# Patient Record
Sex: FEMALE | Race: WHITE | NOT HISPANIC OR LATINO | Employment: OTHER | URBAN - METROPOLITAN AREA
[De-identification: names, ages, dates, MRNs, and addresses within clinical notes are randomized per-mention and may not be internally consistent; named-entity substitution may affect disease eponyms.]

---

## 2024-01-03 ENCOUNTER — MEDICAL CORRESPONDENCE (OUTPATIENT)
Dept: HEALTH INFORMATION MANAGEMENT | Facility: CLINIC | Age: 64
End: 2024-01-03

## 2024-03-26 ENCOUNTER — TRANSFERRED RECORDS (OUTPATIENT)
Dept: HEALTH INFORMATION MANAGEMENT | Facility: CLINIC | Age: 64
End: 2024-03-26

## 2024-04-23 ENCOUNTER — ANESTHESIA EVENT (OUTPATIENT)
Dept: SURGERY | Facility: CLINIC | Age: 64
End: 2024-04-23
Payer: COMMERCIAL

## 2024-04-23 RX ORDER — MULTIVITAMIN WITH IRON
500 TABLET ORAL DAILY
COMMUNITY

## 2024-04-23 RX ORDER — AMLODIPINE BESYLATE 5 MG/1
5 TABLET ORAL DAILY
COMMUNITY

## 2024-04-23 RX ORDER — VIT C/B6/B5/MAGNESIUM/HERB 173 50-5-6-5MG
500 CAPSULE ORAL DAILY
COMMUNITY

## 2024-04-23 RX ORDER — ZINC GLUCONATE 50 MG
50 TABLET ORAL DAILY
COMMUNITY

## 2024-04-23 RX ORDER — GINKGO BILOBA LEAF EXTRACT 60 MG
1 CAPSULE ORAL DAILY
COMMUNITY

## 2024-04-23 ASSESSMENT — LIFESTYLE VARIABLES: TOBACCO_USE: 0

## 2024-04-23 ASSESSMENT — COPD QUESTIONNAIRES: COPD: 0

## 2024-04-24 ENCOUNTER — ANESTHESIA (OUTPATIENT)
Dept: SURGERY | Facility: CLINIC | Age: 64
End: 2024-04-24
Payer: COMMERCIAL

## 2024-04-24 ENCOUNTER — HOSPITAL ENCOUNTER (INPATIENT)
Facility: CLINIC | Age: 64
LOS: 1 days | Discharge: HOME | End: 2024-04-25
Attending: COLON & RECTAL SURGERY | Admitting: COLON & RECTAL SURGERY
Payer: COMMERCIAL

## 2024-04-24 ENCOUNTER — MEDICAL CORRESPONDENCE (OUTPATIENT)
Dept: HEALTH INFORMATION MANAGEMENT | Facility: CLINIC | Age: 64
End: 2024-04-24
Payer: COMMERCIAL

## 2024-04-24 DIAGNOSIS — N81.2 INCOMPLETE UTEROVAGINAL PROLAPSE: Primary | ICD-10-CM

## 2024-04-24 PROBLEM — N81.9 PROLAPSE OF FEMALE PELVIC ORGANS: Status: ACTIVE | Noted: 2024-04-24

## 2024-04-24 LAB
ABO/RH(D): NORMAL
ALBUMIN SERPL BCG-MCNC: 4.7 G/DL (ref 3.5–5.2)
ANION GAP SERPL CALCULATED.3IONS-SCNC: 16 MMOL/L (ref 7–15)
ANTIBODY SCREEN: NEGATIVE
BUN SERPL-MCNC: 22.6 MG/DL (ref 8–23)
CALCIUM SERPL-MCNC: 9.5 MG/DL (ref 8.8–10.2)
CHLORIDE SERPL-SCNC: 104 MMOL/L (ref 98–107)
CREAT SERPL-MCNC: 1.18 MG/DL (ref 0.51–0.95)
DEPRECATED HCO3 PLAS-SCNC: 22 MMOL/L (ref 22–29)
EGFRCR SERPLBLD CKD-EPI 2021: 51 ML/MIN/1.73M2
ERYTHROCYTE [DISTWIDTH] IN BLOOD BY AUTOMATED COUNT: 13.1 % (ref 10–15)
GLUCOSE SERPL-MCNC: 79 MG/DL (ref 70–99)
HCT VFR BLD AUTO: 41.8 % (ref 35–47)
HGB BLD-MCNC: 13.9 G/DL (ref 11.7–15.7)
MCH RBC QN AUTO: 30.6 PG (ref 26.5–33)
MCHC RBC AUTO-ENTMCNC: 33.3 G/DL (ref 31.5–36.5)
MCV RBC AUTO: 92 FL (ref 78–100)
PLATELET # BLD AUTO: 263 10E3/UL (ref 150–450)
POTASSIUM SERPL-SCNC: 3.9 MMOL/L (ref 3.4–5.3)
RBC # BLD AUTO: 4.54 10E6/UL (ref 3.8–5.2)
SODIUM SERPL-SCNC: 142 MMOL/L (ref 135–145)
SPECIMEN EXPIRATION DATE: NORMAL
WBC # BLD AUTO: 8.2 10E3/UL (ref 4–11)

## 2024-04-24 PROCEDURE — 0UT74ZZ RESECTION OF BILATERAL FALLOPIAN TUBES, PERCUTANEOUS ENDOSCOPIC APPROACH: ICD-10-PCS | Performed by: OBSTETRICS & GYNECOLOGY

## 2024-04-24 PROCEDURE — 0USG4ZZ REPOSITION VAGINA, PERCUTANEOUS ENDOSCOPIC APPROACH: ICD-10-PCS | Performed by: OBSTETRICS & GYNECOLOGY

## 2024-04-24 PROCEDURE — 0UT24ZZ RESECTION OF BILATERAL OVARIES, PERCUTANEOUS ENDOSCOPIC APPROACH: ICD-10-PCS | Performed by: OBSTETRICS & GYNECOLOGY

## 2024-04-24 PROCEDURE — 88307 TISSUE EXAM BY PATHOLOGIST: CPT | Mod: 26 | Performed by: PATHOLOGY

## 2024-04-24 PROCEDURE — 0UT94ZL RESECTION OF UTERUS, SUPRACERVICAL, PERCUTANEOUS ENDOSCOPIC APPROACH: ICD-10-PCS | Performed by: OBSTETRICS & GYNECOLOGY

## 2024-04-24 PROCEDURE — 258N000003 HC RX IP 258 OP 636: Performed by: COLON & RECTAL SURGERY

## 2024-04-24 PROCEDURE — 250N000011 HC RX IP 250 OP 636: Performed by: COLON & RECTAL SURGERY

## 2024-04-24 PROCEDURE — 45400 LAPAROSCOPIC PROC: CPT | Performed by: ANESTHESIOLOGY

## 2024-04-24 PROCEDURE — 370N000017 HC ANESTHESIA TECHNICAL FEE, PER MIN: Performed by: COLON & RECTAL SURGERY

## 2024-04-24 PROCEDURE — 82040 ASSAY OF SERUM ALBUMIN: CPT | Performed by: COLON & RECTAL SURGERY

## 2024-04-24 PROCEDURE — C1771 REP DEV, URINARY, W/SLING: HCPCS | Performed by: COLON & RECTAL SURGERY

## 2024-04-24 PROCEDURE — 88307 TISSUE EXAM BY PATHOLOGIST: CPT | Mod: TC | Performed by: COLON & RECTAL SURGERY

## 2024-04-24 PROCEDURE — 85027 COMPLETE CBC AUTOMATED: CPT | Performed by: COLON & RECTAL SURGERY

## 2024-04-24 PROCEDURE — 710N000009 HC RECOVERY PHASE 1, LEVEL 1, PER MIN: Performed by: COLON & RECTAL SURGERY

## 2024-04-24 PROCEDURE — 0TSD4ZZ REPOSITION URETHRA, PERCUTANEOUS ENDOSCOPIC APPROACH: ICD-10-PCS | Performed by: OBSTETRICS & GYNECOLOGY

## 2024-04-24 PROCEDURE — 0DSP4ZZ REPOSITION RECTUM, PERCUTANEOUS ENDOSCOPIC APPROACH: ICD-10-PCS | Performed by: COLON & RECTAL SURGERY

## 2024-04-24 PROCEDURE — 250N000013 HC RX MED GY IP 250 OP 250 PS 637: Performed by: OBSTETRICS & GYNECOLOGY

## 2024-04-24 PROCEDURE — 250N000009 HC RX 250: Performed by: NURSE ANESTHETIST, CERTIFIED REGISTERED

## 2024-04-24 PROCEDURE — 0TJB8ZZ INSPECTION OF BLADDER, VIA NATURAL OR ARTIFICIAL OPENING ENDOSCOPIC: ICD-10-PCS | Performed by: OBSTETRICS & GYNECOLOGY

## 2024-04-24 PROCEDURE — 120N000001 HC R&B MED SURG/OB

## 2024-04-24 PROCEDURE — 360N000080 HC SURGERY LEVEL 7, PER MIN: Performed by: COLON & RECTAL SURGERY

## 2024-04-24 PROCEDURE — 258N000003 HC RX IP 258 OP 636: Performed by: ANESTHESIOLOGY

## 2024-04-24 PROCEDURE — 0UQG4ZZ REPAIR VAGINA, PERCUTANEOUS ENDOSCOPIC APPROACH: ICD-10-PCS | Performed by: OBSTETRICS & GYNECOLOGY

## 2024-04-24 PROCEDURE — 250N000025 HC SEVOFLURANE, PER MIN: Performed by: COLON & RECTAL SURGERY

## 2024-04-24 PROCEDURE — 0DUP4JZ SUPPLEMENT RECTUM WITH SYNTHETIC SUBSTITUTE, PERCUTANEOUS ENDOSCOPIC APPROACH: ICD-10-PCS | Performed by: COLON & RECTAL SURGERY

## 2024-04-24 PROCEDURE — 45400 LAPAROSCOPIC PROC: CPT | Performed by: NURSE ANESTHETIST, CERTIFIED REGISTERED

## 2024-04-24 PROCEDURE — 258N000001 HC RX 258: Performed by: COLON & RECTAL SURGERY

## 2024-04-24 PROCEDURE — 250N000011 HC RX IP 250 OP 636: Performed by: NURSE ANESTHETIST, CERTIFIED REGISTERED

## 2024-04-24 PROCEDURE — 250N000009 HC RX 250: Performed by: COLON & RECTAL SURGERY

## 2024-04-24 PROCEDURE — 272N000001 HC OR GENERAL SUPPLY STERILE: Performed by: COLON & RECTAL SURGERY

## 2024-04-24 PROCEDURE — 0JQC0ZZ REPAIR PELVIC REGION SUBCUTANEOUS TISSUE AND FASCIA, OPEN APPROACH: ICD-10-PCS | Performed by: OBSTETRICS & GYNECOLOGY

## 2024-04-24 PROCEDURE — 250N000011 HC RX IP 250 OP 636: Performed by: ANESTHESIOLOGY

## 2024-04-24 PROCEDURE — 8E0W4CZ ROBOTIC ASSISTED PROCEDURE OF TRUNK REGION, PERCUTANEOUS ENDOSCOPIC APPROACH: ICD-10-PCS | Performed by: OBSTETRICS & GYNECOLOGY

## 2024-04-24 PROCEDURE — C1763 CONN TISS, NON-HUMAN: HCPCS | Performed by: COLON & RECTAL SURGERY

## 2024-04-24 PROCEDURE — 86900 BLOOD TYPING SEROLOGIC ABO: CPT | Performed by: ANESTHESIOLOGY

## 2024-04-24 PROCEDURE — 80048 BASIC METABOLIC PNL TOTAL CA: CPT | Performed by: COLON & RECTAL SURGERY

## 2024-04-24 PROCEDURE — 258N000003 HC RX IP 258 OP 636: Performed by: NURSE ANESTHETIST, CERTIFIED REGISTERED

## 2024-04-24 PROCEDURE — 250N000013 HC RX MED GY IP 250 OP 250 PS 637: Performed by: COLON & RECTAL SURGERY

## 2024-04-24 PROCEDURE — 36415 COLL VENOUS BLD VENIPUNCTURE: CPT | Performed by: COLON & RECTAL SURGERY

## 2024-04-24 PROCEDURE — 999N000141 HC STATISTIC PRE-PROCEDURE NURSING ASSESSMENT: Performed by: COLON & RECTAL SURGERY

## 2024-04-24 DEVICE — SYNTHETIC MESH
Type: IMPLANTABLE DEVICE | Site: PERITONEUM | Status: FUNCTIONAL
Brand: POLYFORM™

## 2024-04-24 DEVICE — TRANSVAGINAL MID-URETHRAL SLING
Type: IMPLANTABLE DEVICE | Site: PELVIS | Status: FUNCTIONAL
Brand: ADVANTAGE FIT™  SYSTEM

## 2024-04-24 RX ORDER — LEVOTHYROXINE SODIUM 75 UG/1
75 TABLET ORAL EVERY EVENING
Status: DISCONTINUED | OUTPATIENT
Start: 2024-04-24 | End: 2024-04-25 | Stop reason: HOSPADM

## 2024-04-24 RX ORDER — SIMETHICONE 80 MG
80 TABLET,CHEWABLE ORAL 4 TIMES DAILY PRN
Status: DISCONTINUED | OUTPATIENT
Start: 2024-04-24 | End: 2024-04-25 | Stop reason: HOSPADM

## 2024-04-24 RX ORDER — DEXAMETHASONE SODIUM PHOSPHATE 4 MG/ML
INJECTION, SOLUTION INTRA-ARTICULAR; INTRALESIONAL; INTRAMUSCULAR; INTRAVENOUS; SOFT TISSUE PRN
Status: DISCONTINUED | OUTPATIENT
Start: 2024-04-24 | End: 2024-04-24

## 2024-04-24 RX ORDER — HYDROMORPHONE HCL IN WATER/PF 6 MG/30 ML
0.2 PATIENT CONTROLLED ANALGESIA SYRINGE INTRAVENOUS
Status: DISCONTINUED | OUTPATIENT
Start: 2024-04-24 | End: 2024-04-25 | Stop reason: HOSPADM

## 2024-04-24 RX ORDER — KETOROLAC TROMETHAMINE 15 MG/ML
15 INJECTION, SOLUTION INTRAMUSCULAR; INTRAVENOUS EVERY 6 HOURS PRN
Status: DISCONTINUED | OUTPATIENT
Start: 2024-04-24 | End: 2024-04-25 | Stop reason: HOSPADM

## 2024-04-24 RX ORDER — ONDANSETRON 2 MG/ML
INJECTION INTRAMUSCULAR; INTRAVENOUS PRN
Status: DISCONTINUED | OUTPATIENT
Start: 2024-04-24 | End: 2024-04-24

## 2024-04-24 RX ORDER — ONDANSETRON 4 MG/1
4 TABLET, ORALLY DISINTEGRATING ORAL EVERY 30 MIN PRN
Status: DISCONTINUED | OUTPATIENT
Start: 2024-04-24 | End: 2024-04-24 | Stop reason: HOSPADM

## 2024-04-24 RX ORDER — ACETAMINOPHEN 325 MG/1
650 TABLET ORAL EVERY 4 HOURS PRN
Status: DISCONTINUED | OUTPATIENT
Start: 2024-04-27 | End: 2024-04-25 | Stop reason: HOSPADM

## 2024-04-24 RX ORDER — FENTANYL CITRATE 0.05 MG/ML
50 INJECTION, SOLUTION INTRAMUSCULAR; INTRAVENOUS
Status: DISCONTINUED | OUTPATIENT
Start: 2024-04-24 | End: 2024-04-24 | Stop reason: HOSPADM

## 2024-04-24 RX ORDER — SODIUM CHLORIDE, SODIUM LACTATE, POTASSIUM CHLORIDE, CALCIUM CHLORIDE 600; 310; 30; 20 MG/100ML; MG/100ML; MG/100ML; MG/100ML
INJECTION, SOLUTION INTRAVENOUS CONTINUOUS
Status: DISCONTINUED | OUTPATIENT
Start: 2024-04-24 | End: 2024-04-24 | Stop reason: HOSPADM

## 2024-04-24 RX ORDER — FENTANYL CITRATE 50 UG/ML
INJECTION, SOLUTION INTRAMUSCULAR; INTRAVENOUS PRN
Status: DISCONTINUED | OUTPATIENT
Start: 2024-04-24 | End: 2024-04-24

## 2024-04-24 RX ORDER — NALOXONE HYDROCHLORIDE 0.4 MG/ML
0.2 INJECTION, SOLUTION INTRAMUSCULAR; INTRAVENOUS; SUBCUTANEOUS
Status: DISCONTINUED | OUTPATIENT
Start: 2024-04-24 | End: 2024-04-25 | Stop reason: HOSPADM

## 2024-04-24 RX ORDER — ONDANSETRON 4 MG/1
4 TABLET, ORALLY DISINTEGRATING ORAL EVERY 6 HOURS PRN
Status: DISCONTINUED | OUTPATIENT
Start: 2024-04-24 | End: 2024-04-25 | Stop reason: HOSPADM

## 2024-04-24 RX ORDER — CEFAZOLIN SODIUM/WATER 2 G/20 ML
2 SYRINGE (ML) INTRAVENOUS SEE ADMIN INSTRUCTIONS
Status: DISCONTINUED | OUTPATIENT
Start: 2024-04-24 | End: 2024-04-24 | Stop reason: HOSPADM

## 2024-04-24 RX ORDER — GEMFIBROZIL 600 MG/1
300 TABLET, FILM COATED ORAL ONCE
COMMUNITY

## 2024-04-24 RX ORDER — ONDANSETRON 2 MG/ML
4 INJECTION INTRAMUSCULAR; INTRAVENOUS EVERY 30 MIN PRN
Status: DISCONTINUED | OUTPATIENT
Start: 2024-04-24 | End: 2024-04-24 | Stop reason: HOSPADM

## 2024-04-24 RX ORDER — HYDROMORPHONE HCL IN WATER/PF 6 MG/30 ML
0.2 PATIENT CONTROLLED ANALGESIA SYRINGE INTRAVENOUS EVERY 5 MIN PRN
Status: DISCONTINUED | OUTPATIENT
Start: 2024-04-24 | End: 2024-04-24 | Stop reason: HOSPADM

## 2024-04-24 RX ORDER — PHENAZOPYRIDINE HYDROCHLORIDE 200 MG/1
200 TABLET, FILM COATED ORAL ONCE
Status: COMPLETED | OUTPATIENT
Start: 2024-04-24 | End: 2024-04-24

## 2024-04-24 RX ORDER — LIDOCAINE 40 MG/G
CREAM TOPICAL
Status: DISCONTINUED | OUTPATIENT
Start: 2024-04-24 | End: 2024-04-25 | Stop reason: HOSPADM

## 2024-04-24 RX ORDER — FENTANYL CITRATE 0.05 MG/ML
25 INJECTION, SOLUTION INTRAMUSCULAR; INTRAVENOUS EVERY 5 MIN PRN
Status: DISCONTINUED | OUTPATIENT
Start: 2024-04-24 | End: 2024-04-24 | Stop reason: HOSPADM

## 2024-04-24 RX ORDER — NALOXONE HYDROCHLORIDE 0.4 MG/ML
0.1 INJECTION, SOLUTION INTRAMUSCULAR; INTRAVENOUS; SUBCUTANEOUS
Status: DISCONTINUED | OUTPATIENT
Start: 2024-04-24 | End: 2024-04-24 | Stop reason: HOSPADM

## 2024-04-24 RX ORDER — HYDROMORPHONE HCL IN WATER/PF 6 MG/30 ML
0.4 PATIENT CONTROLLED ANALGESIA SYRINGE INTRAVENOUS
Status: DISCONTINUED | OUTPATIENT
Start: 2024-04-24 | End: 2024-04-25 | Stop reason: HOSPADM

## 2024-04-24 RX ORDER — FENTANYL CITRATE 0.05 MG/ML
50 INJECTION, SOLUTION INTRAMUSCULAR; INTRAVENOUS EVERY 5 MIN PRN
Status: DISCONTINUED | OUTPATIENT
Start: 2024-04-24 | End: 2024-04-24 | Stop reason: HOSPADM

## 2024-04-24 RX ORDER — ONDANSETRON 2 MG/ML
4 INJECTION INTRAMUSCULAR; INTRAVENOUS ONCE
Status: COMPLETED | OUTPATIENT
Start: 2024-04-24 | End: 2024-04-24

## 2024-04-24 RX ORDER — METRONIDAZOLE 500 MG/100ML
500 INJECTION, SOLUTION INTRAVENOUS
Status: COMPLETED | OUTPATIENT
Start: 2024-04-24 | End: 2024-04-24

## 2024-04-24 RX ORDER — ACETAMINOPHEN 325 MG/1
975 TABLET ORAL EVERY 8 HOURS
Status: DISCONTINUED | OUTPATIENT
Start: 2024-04-24 | End: 2024-04-25 | Stop reason: HOSPADM

## 2024-04-24 RX ORDER — OXYCODONE HYDROCHLORIDE 5 MG/1
5 TABLET ORAL EVERY 4 HOURS PRN
Status: DISCONTINUED | OUTPATIENT
Start: 2024-04-24 | End: 2024-04-25 | Stop reason: HOSPADM

## 2024-04-24 RX ORDER — ONDANSETRON 2 MG/ML
4 INJECTION INTRAMUSCULAR; INTRAVENOUS EVERY 6 HOURS PRN
Status: DISCONTINUED | OUTPATIENT
Start: 2024-04-24 | End: 2024-04-25 | Stop reason: HOSPADM

## 2024-04-24 RX ORDER — SODIUM CHLORIDE, SODIUM LACTATE, POTASSIUM CHLORIDE, CALCIUM CHLORIDE 600; 310; 30; 20 MG/100ML; MG/100ML; MG/100ML; MG/100ML
INJECTION, SOLUTION INTRAVENOUS CONTINUOUS
Status: DISCONTINUED | OUTPATIENT
Start: 2024-04-24 | End: 2024-04-25 | Stop reason: HOSPADM

## 2024-04-24 RX ORDER — PROPOFOL 10 MG/ML
INJECTION, EMULSION INTRAVENOUS PRN
Status: DISCONTINUED | OUTPATIENT
Start: 2024-04-24 | End: 2024-04-24

## 2024-04-24 RX ORDER — ACETAMINOPHEN 325 MG/1
975 TABLET ORAL ONCE
Status: COMPLETED | OUTPATIENT
Start: 2024-04-24 | End: 2024-04-24

## 2024-04-24 RX ORDER — NALOXONE HYDROCHLORIDE 0.4 MG/ML
0.4 INJECTION, SOLUTION INTRAMUSCULAR; INTRAVENOUS; SUBCUTANEOUS
Status: DISCONTINUED | OUTPATIENT
Start: 2024-04-24 | End: 2024-04-25 | Stop reason: HOSPADM

## 2024-04-24 RX ORDER — BUPIVACAINE HYDROCHLORIDE 2.5 MG/ML
INJECTION, SOLUTION INFILTRATION; PERINEURAL PRN
Status: DISCONTINUED | OUTPATIENT
Start: 2024-04-24 | End: 2024-04-24 | Stop reason: HOSPADM

## 2024-04-24 RX ORDER — LIDOCAINE HYDROCHLORIDE 20 MG/ML
INJECTION, SOLUTION INFILTRATION; PERINEURAL PRN
Status: DISCONTINUED | OUTPATIENT
Start: 2024-04-24 | End: 2024-04-24

## 2024-04-24 RX ORDER — LORAZEPAM 2 MG/ML
0.5 INJECTION INTRAMUSCULAR EVERY 6 HOURS PRN
Status: DISCONTINUED | OUTPATIENT
Start: 2024-04-24 | End: 2024-04-25 | Stop reason: HOSPADM

## 2024-04-24 RX ORDER — CEFAZOLIN SODIUM/WATER 2 G/20 ML
2 SYRINGE (ML) INTRAVENOUS
Status: COMPLETED | OUTPATIENT
Start: 2024-04-24 | End: 2024-04-24

## 2024-04-24 RX ORDER — HYDROMORPHONE HCL IN WATER/PF 6 MG/30 ML
0.4 PATIENT CONTROLLED ANALGESIA SYRINGE INTRAVENOUS EVERY 5 MIN PRN
Status: DISCONTINUED | OUTPATIENT
Start: 2024-04-24 | End: 2024-04-24 | Stop reason: HOSPADM

## 2024-04-24 RX ORDER — MAGNESIUM HYDROXIDE 1200 MG/15ML
LIQUID ORAL PRN
Status: DISCONTINUED | OUTPATIENT
Start: 2024-04-24 | End: 2024-04-24 | Stop reason: HOSPADM

## 2024-04-24 RX ORDER — PROPOFOL 10 MG/ML
INJECTION, EMULSION INTRAVENOUS CONTINUOUS PRN
Status: DISCONTINUED | OUTPATIENT
Start: 2024-04-24 | End: 2024-04-24

## 2024-04-24 RX ORDER — OXYCODONE HYDROCHLORIDE 5 MG/1
10 TABLET ORAL EVERY 4 HOURS PRN
Status: DISCONTINUED | OUTPATIENT
Start: 2024-04-24 | End: 2024-04-25 | Stop reason: HOSPADM

## 2024-04-24 RX ORDER — LEVOTHYROXINE SODIUM 75 UG/1
75 TABLET ORAL DAILY
COMMUNITY

## 2024-04-24 RX ORDER — DIPHENHYDRAMINE HYDROCHLORIDE 50 MG/ML
25 INJECTION INTRAMUSCULAR; INTRAVENOUS EVERY 6 HOURS PRN
Status: DISCONTINUED | OUTPATIENT
Start: 2024-04-24 | End: 2024-04-25 | Stop reason: HOSPADM

## 2024-04-24 RX ADMIN — ROCURONIUM BROMIDE 10 MG: 50 INJECTION, SOLUTION INTRAVENOUS at 10:06

## 2024-04-24 RX ADMIN — METRONIDAZOLE 500 MG: 500 INJECTION, SOLUTION INTRAVENOUS at 06:40

## 2024-04-24 RX ADMIN — PHENYLEPHRINE HYDROCHLORIDE 100 MCG: 10 INJECTION INTRAVENOUS at 08:18

## 2024-04-24 RX ADMIN — ONDANSETRON 4 MG: 2 INJECTION INTRAMUSCULAR; INTRAVENOUS at 11:11

## 2024-04-24 RX ADMIN — ONDANSETRON 4 MG: 2 INJECTION INTRAMUSCULAR; INTRAVENOUS at 07:05

## 2024-04-24 RX ADMIN — ACETAMINOPHEN 975 MG: 325 TABLET, FILM COATED ORAL at 06:30

## 2024-04-24 RX ADMIN — SODIUM CHLORIDE, POTASSIUM CHLORIDE, SODIUM LACTATE AND CALCIUM CHLORIDE: 600; 310; 30; 20 INJECTION, SOLUTION INTRAVENOUS at 14:19

## 2024-04-24 RX ADMIN — PHENYLEPHRINE HYDROCHLORIDE 0.25 MCG/KG/MIN: 10 INJECTION INTRAVENOUS at 09:13

## 2024-04-24 RX ADMIN — HYDROMORPHONE HYDROCHLORIDE 0.5 MG: 1 INJECTION, SOLUTION INTRAMUSCULAR; INTRAVENOUS; SUBCUTANEOUS at 08:42

## 2024-04-24 RX ADMIN — SODIUM CHLORIDE, POTASSIUM CHLORIDE, SODIUM LACTATE AND CALCIUM CHLORIDE: 600; 310; 30; 20 INJECTION, SOLUTION INTRAVENOUS at 09:13

## 2024-04-24 RX ADMIN — FENTANYL CITRATE 100 MCG: 50 INJECTION INTRAMUSCULAR; INTRAVENOUS at 08:03

## 2024-04-24 RX ADMIN — DEXAMETHASONE SODIUM PHOSPHATE 4 MG: 4 INJECTION, SOLUTION INTRA-ARTICULAR; INTRALESIONAL; INTRAMUSCULAR; INTRAVENOUS; SOFT TISSUE at 08:15

## 2024-04-24 RX ADMIN — ACETAMINOPHEN 975 MG: 325 TABLET, FILM COATED ORAL at 21:12

## 2024-04-24 RX ADMIN — HYDROMORPHONE HYDROCHLORIDE 0.4 MG: 0.2 INJECTION, SOLUTION INTRAMUSCULAR; INTRAVENOUS; SUBCUTANEOUS at 16:23

## 2024-04-24 RX ADMIN — FENTANYL CITRATE 50 MCG: 50 INJECTION, SOLUTION INTRAMUSCULAR; INTRAVENOUS at 12:16

## 2024-04-24 RX ADMIN — PHENAZOPYRIDINE 200 MG: 200 TABLET ORAL at 06:30

## 2024-04-24 RX ADMIN — ROCURONIUM BROMIDE 10 MG: 50 INJECTION, SOLUTION INTRAVENOUS at 10:46

## 2024-04-24 RX ADMIN — ROCURONIUM BROMIDE 30 MG: 50 INJECTION, SOLUTION INTRAVENOUS at 08:58

## 2024-04-24 RX ADMIN — Medication 2 G: at 07:50

## 2024-04-24 RX ADMIN — KETOROLAC TROMETHAMINE 15 MG: 15 INJECTION, SOLUTION INTRAMUSCULAR; INTRAVENOUS at 16:23

## 2024-04-24 RX ADMIN — HYDROMORPHONE HYDROCHLORIDE 0.5 MG: 1 INJECTION, SOLUTION INTRAMUSCULAR; INTRAVENOUS; SUBCUTANEOUS at 10:54

## 2024-04-24 RX ADMIN — PHENYLEPHRINE HYDROCHLORIDE 100 MCG: 10 INJECTION INTRAVENOUS at 08:21

## 2024-04-24 RX ADMIN — HYDROMORPHONE HYDROCHLORIDE 0.2 MG: 0.2 INJECTION, SOLUTION INTRAMUSCULAR; INTRAVENOUS; SUBCUTANEOUS at 13:12

## 2024-04-24 RX ADMIN — LIDOCAINE HYDROCHLORIDE 100 MG: 20 INJECTION, SOLUTION INFILTRATION; PERINEURAL at 08:03

## 2024-04-24 RX ADMIN — PHENYLEPHRINE HYDROCHLORIDE 100 MCG: 10 INJECTION INTRAVENOUS at 09:13

## 2024-04-24 RX ADMIN — SUGAMMADEX 200 MG: 100 INJECTION, SOLUTION INTRAVENOUS at 11:33

## 2024-04-24 RX ADMIN — PHENYLEPHRINE HYDROCHLORIDE 100 MCG: 10 INJECTION INTRAVENOUS at 08:54

## 2024-04-24 RX ADMIN — PROPOFOL 170 MG: 10 INJECTION, EMULSION INTRAVENOUS at 08:03

## 2024-04-24 RX ADMIN — FENTANYL CITRATE 50 MCG: 50 INJECTION, SOLUTION INTRAMUSCULAR; INTRAVENOUS at 12:29

## 2024-04-24 RX ADMIN — MIDAZOLAM 2 MG: 1 INJECTION INTRAMUSCULAR; INTRAVENOUS at 07:50

## 2024-04-24 RX ADMIN — SODIUM CHLORIDE, POTASSIUM CHLORIDE, SODIUM LACTATE AND CALCIUM CHLORIDE: 600; 310; 30; 20 INJECTION, SOLUTION INTRAVENOUS at 06:39

## 2024-04-24 RX ADMIN — PROPOFOL 30 MCG/KG/MIN: 10 INJECTION, EMULSION INTRAVENOUS at 08:08

## 2024-04-24 RX ADMIN — ROCURONIUM BROMIDE 50 MG: 50 INJECTION, SOLUTION INTRAVENOUS at 08:03

## 2024-04-24 RX ADMIN — LEVOTHYROXINE SODIUM 75 MCG: 75 TABLET ORAL at 21:12

## 2024-04-24 ASSESSMENT — ACTIVITIES OF DAILY LIVING (ADL)
ADLS_ACUITY_SCORE: 27
ADLS_ACUITY_SCORE: 18
ADLS_ACUITY_SCORE: 27
ADLS_ACUITY_SCORE: 35
ADLS_ACUITY_SCORE: 22
ADLS_ACUITY_SCORE: 22
ADLS_ACUITY_SCORE: 18
ADLS_ACUITY_SCORE: 22
ADLS_ACUITY_SCORE: 27
ADLS_ACUITY_SCORE: 18
ADLS_ACUITY_SCORE: 27
ADLS_ACUITY_SCORE: 18
ADLS_ACUITY_SCORE: 33
ADLS_ACUITY_SCORE: 27
ADLS_ACUITY_SCORE: 22

## 2024-04-24 NOTE — DISCHARGE INSTRUCTIONS
[facility]  Prolapse/Pelvic Reconstructive Surgery  Instructions for Caring for yourself after Surgery     How do I manage my pain?   Pain and tenderness should lessen each day. To help keep pain under control, use the following guidelines:  Apply ice packs to your perineum (vaginal and rectal area) for the 1st couple of days.  Take 600 milligrams (mg) of ibuprofen (Advil) every 6 hours for the 1st several days.   Use your prescribed narcotic (hydromorphone) for additional pain relief as needed.  Do not drive, drink alcohol or make any major decisions, such as signing important papers or managing legal issues, while taking prescription pain medication.   Take pain medication with food to avoid an upset stomach.    How do I care for my perineum?  Use pads for vaginal discharge after surgery. Discharge is normal and can last several weeks. Discharge may appear bloody, yellow or white.  Do not place anything in your vagina until advised by your doctor.     What about bathing?     Do not take a tub bath, use a hot tub or swim until advised by your doctor. You may take showers.    What about bowel and bladder management?  Keep stools soft and regular. We recommend using the following medicine that loosens stools and increases bowel movements:  MiraLAX-  17 grams or a capful daily following surgery.    When urinating, do not bear down. Relax and allow the bladder muscle to contract. If you are unable to urinate, contact your doctor.  If you go home with a catheter, your doctor may prescribe an antibiotic for you to take before bed to help prevent infection. Follow up in the clinic as instructed to have the catheter removed.    What about activity?  Do not lift more than 10 pounds for 12 weeks after surgery. Avoid heavy pushing or pulling, such as vacuuming or lawn mowing. Your body s tissues need time to heal and regain maximum strength.  Keep Active. Walking is encouraged. Gradually build up how long and far  you walk. Climbing stairs is OK if able.      You may resume driving when you are no longer taking narcotic pain medication and have the strength to use the brake pedal as needed.     When do I call my doctor?  Call Dr. Livingston call [phone] if you have:     (for urgent questions/concerns CELL PHONE [phone])  -Any post-operative questions or concerns   -A fever over 100.4 F (38 C)    -Difficulty emptying your bladder  -Chills       -Worsening pain              -Nausea or vomiting

## 2024-04-24 NOTE — BRIEF OP NOTE
[facility]    Brief Operative Note    Pre-operative diagnosis: Special screening for malignant neoplasms, colon [Z12.11]  Post-operative diagnosis Same as pre-operative diagnosis    Procedure: ROBOTIC SUTURE RECTOPEXY, N/A - Abdomen  ROBOTIC LAPAROSCOPIC SACRAL COLPOPEXY, SUPRACERVICAL HYSTERECTOMY WITH BILATERAL SALPINGECTOMY, ABDOMINAL ENTEROCELE REPAIR, CYSTOSCOPY POSSIBLE MIDURETHRAL SLING, N/A - Abdomen  BILATERAL OOPHORECTOMY, Bilateral - Abdomen  POSTERIOR REPAIR, N/A - Perineum    Surgeon: Surgeons and Role:  Panel 1:     * Judith Atwood MD - Primary     * Jonn Julien PA-C - Assisting  Panel 2:     * Lynn Livingston MD - Primary  Panel 3:     * Lynn Livingston MD - Primary  Anesthesia: General   Estimated Blood Loss: 50 mL    Drains: None  Specimens:   ID Type Source Tests Collected by Time Destination   1 : Uterus, Bilateral Fallopian Tubes & Ovaries Tissue Uterus, Bilateral Fallopian Tubes & Ovaries SURGICAL PATHOLOGY EXAM Judith Atwood MD 4/24/2024 11:14 AM      Findings:   None.  Complications: None.  Implants:   Implant Name Type Inv. Item Serial No.  Lot No. LRB No. Used Action   MESH SLING PROLAPSE POLYFORM SYNTH [id number] - [id number] Mesh MESH SLING PROLAPSE POLYFORM SYNTH [id number]  [employer] [id number] N/A 1 Implanted   MESH SLING ADVANTAGE FIT SYSTEM [id number] - [id number] Mesh MESH SLING ADVANTAGE FIT SYSTEM [id number]  [employer] [id number] N/A 1 Implanted       Condition on discharge from OR: Satisfactory    Jonn Julien PA-C   [facility]   [phone].        ADDENDUM:    PATIENT DATA  Indicate Y or N:  Home O2 No  Hemodialysis  No  Transplant patient  No  Cirrhosis  No  Steroids in last 30 days  No  Immunomodulators in last 30 days  No  Anticoagulation at time of surgery  No   List medication n/a  Prior abdominal surgery  No  Pelvic  irradiation  No    Albumin within 30 days if known None   Hgb within 30 days if known 13.9   Hemoglobin   Date Value Ref Range Status   04/24/2024 13.9 11.7 - 15.7 g/dL Final   05/15/2007 10.6 (L) 11.7 - 15.7 g/dL Final   ]  Cr within 30 days if known 1.18   Creatinine   Date Value Ref Range Status   04/24/2024 1.18 (H) 0.51 - 0.95 mg/dL Final   05/15/2007 2.89 (H) 0.60 - 1.30 mg/dL Final   ]  Body mass index is 29.49 kg/m .      OR DATA  Emergent  No   <24 hours  No   <1 week  No  Bowel Prep Yes  Antibiotics  Yes  DVT prophylaxis    Heparin  No   SCD  Yes   None  No  Drain  No  ASA (1,2,3,4) 3  OR time (min) 183  Stents  No  Transfuse >/= 2U  No  Anastomosis   Stapled  No   Handsewn  No  Leak Test    Positive  No   Negative  No   Not done  Yes

## 2024-04-24 NOTE — OP NOTE
PREOPERATIVE DIAGNOSIS: Multi-compartment pelvic organ prolapse with fecal and urinary incontinence      POSTOPERATIVE DIAGNOSIS: same     OPERATION PERFORMED:   1. Robotic suture rectopexy   2. Robotic hysterectomy with bilateral with salpingo-oophorectomy with sacrocolpopexy (Dr. Livingston)  3. Cystoscopy and mid-urethral sling (Dr. Livingston)     SURGEON: Judith Atwood MD   CO-SURGEONS: Lynn Livingston MD  ASSISTANT: Jonn Julien PA-C     ANESTHESIA: General.      INDICATION: Liliam Mello is a 63 year old woman who presented for evaluation of multi-compartment pelvic organ prolapse. She had symptoms of fecal and urinary incontinence. Defecography demonstrated anterior compartment prolapse with evidence of a deep internal rectal intussusception. The risks and benefits of proceeding with a robotic hysterectomy with bilateral with salpingo-oophorectomy with sacrocolpopexy, mid urethral sling, cystoscopy and ventral rectopexy have been discussed and she would like to proceed.      OPERATIVE FINDINGS: Dr. Livingston first performed the hysterectomy.  There was significant scarring within the rectovaginal septum. A sacrocolpopexy and a posterior suture rectopexy were performed.     PROCEDURE IN DETAIL: After informed consent was obtained, the patient was brought to the operating room and placed in the supine position on the operating room table. Sequential compression devices were placed on bilateral lower extremities prior to induction, and general anesthesia was induced without difficulty. She was placed in a well-padded dorsal lithotomy position and IV antibiotics were administered. A Pigasi pink pad was used on the operating room table to prevent her from sliding off the table in steep Trendelenburg position. Her abdomen was sterilely prepped and draped in standard fashion. We entered the abdomen at the umbilicus and placed a gel point device.  The abdomen was then insufflated to 15 mmHg which she tolerated  well. Diagnostic laparoscopic then ensued.  A laparoscopic tap block was then performed with a total of 30 mL of 0.25% Marcaine in 4 quadrants. Three additional robotic ports were placed under direct visualization. The patient was placed in steep Trendelenburg position, and a vaginal retractor was placed in order to provide anterior retraction on the vagina. Dr. Livingston performed a robotic hysterectomy.  She then began dissection for a sacrocolpopexy.      The sigmoid colon and small bowel was reflected completely out of the pelvis in order to better view the pelvic floor. The patient has a very deep pouch of Kade, and a lot of laxity in pelvic floor tissues. Dr. Livingston began by raising flaps of peritoneum anteriorly and inferiorly over the vaginal apex. Once she entered into the rectovaginal septum, she then transitioned to clearing the peritoneum over the sacral promontory and the anterior longitudinal ligament was clearly visible. This was suitable for the mesh to eventually be secured in this location. The peritoneum distal to where the inferior mesenteric artery was identified was opened using cautery. The peritoneum was then dissected laterally along the right side of the rectum within the avascular plane. Once the peritoneal reflection was opened along the right side, this was carried down in the avascular plane, but the dissection did not proceed completely posteriorly. This connected with the previous dissection anterior and posterior to the vagina.     I then performed the deep pelvic dissection within the rectovaginal septum, working independently at the robot console. I continued to raise the flap inferiorly along the posterior wall of the vagina into the rectovaginal septum. There was a significant amount of scarring in this plane. A very small, superficial serosal injury was made to the distal rectum.  This was repaired with interrupted 3-0 Vicryl sutures.  There was no evidence of a full-thickness  injury or a deep serosal injury.  Given the very superficial injury to the rectum, the decision was made to not perform a ventral rectopexy, and instead a posterior suture rectopexy.  The rectum was therefore fully mobilized posteriorly within the presacral plane down to the level of the levator muscles.  During the anterior dissection, the levator muscles were also exposed bilaterally.     Dr. Livingston then performed a sacrocolpopexy by securing the mesh to the vaginal apex. There was appropriate laxity between the sutures on the anterior rectal wall and the the sutures to the posterior vagina, without undo tension. She then placed a second piece of mesh on the anterior vaginal wall to complete the repair. Both pieces of mesh was then secured at the pre-cleared location on the sacral promontory with Goretex sutures. These stitches were secured to the anterior longitudinal ligament. The mesh sat comfortably within the pelvis.     I then proceeded with the suture rectopexy.  Two 2-0 Prolene sutures were placed in the anterior longitudinal ligament, and then placed through the lateral peritoneum and mesorectum with care to not injure any vascular structures or the bowel.  These were then secured to the anterior longitudinal ligament.  The mesh was then completely reperitonealized by closing the peritoneum.     All robotic instruments were removed, the robot undocked and the pneumoperitoneum carefully evacuated. Dr. Livingston then performed cystoscopy and mid urethral sling. The umbilical fascia was closed with figure of eight 0-PDS sutures. The skin was irrigated, all incisions closed with 4-0 Monocryl suture and the skin dressed with skin glue.      This operation cannot have been safely performed without compromising the technical results of the procedure without a skilled surgical assistant.  The surgical assistant was necessary for positioning, intraoperative retraction, as well as management of the robotic instruments  at bedside.  They were also necessary for meticulous wound closure. They ensured that the procedure could be completed in a technically safe and efficient manner.    The patient tolerated this procedure well, without complications. The patient was returned to the supine position, extubated in the operating room, and brought to the recovery room in stable condition.      EBL: 50 ml  SPECIMEN: Uterus, bilateral tubes and ovaries  COMPLICATIONS: none     Judith Atwood MD

## 2024-04-24 NOTE — ANESTHESIA CARE TRANSFER NOTE
Patient: Liliam Mello    Procedure: Procedure(s):  ROBOTIC SUTURE RECTOPEXY  ROBOTIC LAPAROSCOPIC SACRAL COLPOPEXY, SUPRACERVICAL HYSTERECTOMY WITH BILATERAL SALPINGECTOMY, ABDOMINAL ENTEROCELE REPAIR, CYSTOSCOPY POSSIBLE MIDURETHRAL SLING  BILATERAL OOPHORECTOMY  POSTERIOR REPAIR       Diagnosis: Special screening for malignant neoplasms, colon [Z12.11]  Diagnosis Additional Information: No value filed.    Anesthesia Type:   General     Note:    Oropharynx: oropharynx clear of all foreign objects and spontaneously breathing  Level of Consciousness: drowsy  Oxygen Supplementation: face mask  Level of Supplemental Oxygen (L/min / FiO2): 6  Independent Airway: airway patency satisfactory and stable  Dentition: dentition unchanged  Vital Signs Stable: post-procedure vital signs reviewed and stable  Report to RN Given: handoff report given  Patient transferred to: PACU  Comments: Neuromuscular blockade reversed with sugammadex, spontaneous respirations, adequate tidal volumes, followed commands to voice, oropharynx suctioned with soft flexible catheter, extubated atraumatically, extubated with suction, airway patent after extubation.  Oxygen via facemask at 6 liters per minute to PACU. Oxygen tubing connected to wall O2 in PACU, SpO2, NiBP, and EKG monitors and alarms on and functioning, Carolina Hugger warmer connected to patient gown, report on patient's clinical status given to PACU RN, RN questions answered.         Handoff Report: Identifed the Patient, Identified the Reponsible Provider, Reviewed the pertinent medical history, Discussed the surgical course, Reviewed Intra-OP anesthesia mangement and issues during anesthesia, Set expectations for post-procedure period and Allowed opportunity for questions and acknowledgement of understanding      Vitals:  Vitals Value Taken Time   BP     Temp     Pulse     Resp     SpO2         Electronically Signed By: JHON Mart CRNA  April 24, 2024  11:49 AM

## 2024-04-24 NOTE — ANESTHESIA POSTPROCEDURE EVALUATION
Patient: Liliam Mello    Procedure: Procedure(s):  ROBOTIC SUTURE RECTOPEXY  ROBOTIC LAPAROSCOPIC SACRAL COLPOPEXY, SUPRACERVICAL HYSTERECTOMY WITH BILATERAL SALPINGECTOMY, ABDOMINAL ENTEROCELE REPAIR, CYSTOSCOPY POSSIBLE MIDURETHRAL SLING  BILATERAL OOPHORECTOMY  POSTERIOR REPAIR       Anesthesia Type:  General    Note:  Disposition: Inpatient   Postop Pain Control: Uneventful            Sign Out: Well controlled pain   PONV: No   Neuro/Psych: Uneventful            Sign Out: Acceptable/Baseline neuro status   Airway/Respiratory: Uneventful            Sign Out: Acceptable/Baseline resp. status   CV/Hemodynamics: Uneventful            Sign Out: Acceptable CV status; No obvious hypovolemia; No obvious fluid overload   Other NRE: NONE   DID A NON-ROUTINE EVENT OCCUR? No       Last vitals:  Vitals Value Taken Time   /78 04/24/24 1330   Temp 36.7  C (98  F) 04/24/24 1312   Pulse 92 04/24/24 1334   Resp 21 04/24/24 1334   SpO2 97 % 04/24/24 1334   Vitals shown include unfiled device data.    Electronically Signed By: Andres Kay MD  April 24, 2024  1:49 PM

## 2024-04-24 NOTE — ANESTHESIA PREPROCEDURE EVALUATION
Anesthesia Pre-Procedure Evaluation    Patient: Liliam Mello   MRN: [id number] : 1960        Procedure : Procedure(s):  ROBOTIC VENTRAL RECTOPEXY  ROBOTIC LAPAROSCOPIC SACRAL COLPOPEXY, SUPRACERVICAL HYSTERECTOMY WITH BILATERAL SALPINGECTOMY, ABDOMINAL ENTEROCELE REPAIR, CYSTOSCOPY POSSIBLE MIDURETHRAL SLING  BILATERAL OOPHORECTOMY  POSTERIOR REPAIR          No past medical history on file.   No past surgical history on file.   Not on File   Social History     Tobacco Use     Smoking status: Not on file     Smokeless tobacco: Not on file   Substance Use Topics     Alcohol use: Not on file      Wt Readings from Last 1 Encounters:   No data found for Wt        Anesthesia Evaluation   Pt has had prior anesthetic. Type: General.    No history of anesthetic complications       ROS/MED HX  ENT/Pulmonary:    (-) tobacco use, asthma, COPD and sleep apnea   Neurologic: Comment: Headaches    (+)    peripheral neuropathy, - herpetiic neuralgia.                           Cardiovascular:     (+) Dyslipidemia hypertension- -   -  - -                                   (-) CAD   METS/Exercise Tolerance:     Hematologic:  - neg hematologic  ROS   (+)       history of blood transfusion, no previous transfusion reaction,        Musculoskeletal:       GI/Hepatic:    (-) GERD and liver disease   Renal/Genitourinary:     (+) renal disease, type: CRI,            Endo:     (+)          thyroid problem, hypothyroidism,        (-) Type I DM and Type II DM   Psychiatric/Substance Use:       Infectious Disease:       Malignancy:   (+) Malignancy, History of Lymphoma/Leukemia.Lymph CA Remission status post.      Other:            Physical Exam    Airway        Mallampati: II   TM distance: > 3 FB   Neck ROM: full   Mouth opening: > 3 cm    Respiratory Devices and Support         Dental     Comment: Missing several molars    (+) Minor Abnormalities - some fillings, tiny chips      Cardiovascular          Rhythm and rate: regular and  "normal     Pulmonary   pulmonary exam normal            OUTSIDE LABS:  CBC:   Lab Results   Component Value Date    WBC 7.2 05/15/2007    WBC 6.6 05/23/2006    HGB 10.6 (L) 05/15/2007    HGB 12.7 05/23/2006    HCT 30.8 (L) 05/15/2007    HCT 36.6 05/23/2006     05/15/2007     05/23/2006     BMP:   Lab Results   Component Value Date     05/15/2007     05/23/2006    POTASSIUM 5.2 05/15/2007    POTASSIUM 4.6 05/23/2006    CHLORIDE 103 05/15/2007    CHLORIDE 102 05/23/2006    CO2 22 05/15/2007    CO2 26 05/23/2006    BUN 46 (H) 05/15/2007    BUN 25 (H) 05/23/2006    CR 2.89 (H) 05/15/2007    CR 1.38 (H) 05/23/2006    GLC 88 05/15/2007    GLC 98 05/23/2006     COAGS: No results found for: \"PTT\", \"INR\", \"FIBR\"  POC: No results found for: \"BGM\", \"HCG\", \"HCGS\"  HEPATIC:   Lab Results   Component Value Date    ALBUMIN 4.2 05/15/2007    PROTTOTAL 7.6 05/15/2007    ALT 16 05/15/2007    AST 20 05/15/2007    ALKPHOS 75 05/15/2007    BILITOTAL <0.1 (L) 05/15/2007     OTHER:   Lab Results   Component Value Date    IRLANDA 10.4 05/15/2007    PHOS 3.9 07/05/2005    MAG 2.1 05/23/2006       Anesthesia Plan    ASA Status:  3    NPO Status:  NPO Appropriate    Anesthesia Type: General.     - Airway: ETT   Induction: Intravenous.   Maintenance: Balanced.   Techniques and Equipment:     - Lines/Monitors: 2nd IV     Consents    Anesthesia Plan(s) and associated risks, benefits, and realistic alternatives discussed. Questions answered and patient/representative(s) expressed understanding.     - Discussed:     - Discussed with:  Patient      - Extended Intubation/Ventilatory Support Discussed: Yes.      Use of blood products discussed: Yes.     - Discussed with: Patient.     - Consented: consented to blood products     Postoperative Care    Pain management: Multi-modal analgesia, IV analgesics.   PONV prophylaxis: Ondansetron (or other 5HT-3), Dexamethasone or Solumedrol     Comments:               Andres Kay, " MD [place] have reviewed the pertinent notes and labs in the chart from the past 30 days and (re)examined the patient.  Any updates or changes from those notes are reflected in this note.                   None

## 2024-04-24 NOTE — ANESTHESIA PROCEDURE NOTES
Airway       Patient location during procedure: OR ([facility] - Operating Room or Procedural Area)       Procedure Start/Stop Times: 4/24/2024 8:06 AM  Staff -        Anesthesiologist:  Andres Kay MD       CRNA: Barbara Charles APRN CRNA       Performed By: CRNA  Consent for Airway        Urgency: elective  Indications and Patient Condition       Indications for airway management: kellee-procedural       Induction type:intravenous       Mask difficulty assessment: 1 - vent by mask    Final Airway Details       Final airway type: endotracheal airway       Successful airway: ETT - single  Endotracheal Airway Details        ETT size (mm): 7.0       Cuffed: yes       Successful intubation technique: direct laryngoscopy       DL Blade Type: H[facility] 2       Grade View of Cords: 1       Adjucts: stylet       Position: Right       Measured from: gums/teeth       Secured at (cm): 21       Bite block used: None    Post intubation assessment        Number of attempts at approach: 1       Number of other approaches attempted: 0       Secured with: tape       Ease of procedure: easy       Dentition: Intact and Unchanged    Medication(s) Administered   Medication Administration Time: 4/24/2024 8:06 AM

## 2024-04-24 NOTE — OP NOTE
OPERATIVE REPORT    NAME: Liliam Mello  MR#: [id number]  : 1960    DATE OF OPERATION: 2024    SURGEON: Lynn Livingston MD    PREOPERATIVE DIAGNOSES:  1. Incomplete uterovaginal prolapse.  2. Associated cystocele, rectocele and enterocele  3. Stress urinary incontinence.  4. Rectal intussusception  5. Fecal incontinence    POSTOPERATIVE DIAGNOSES:  1. Incomplete uterovaginal prolapse.  2. Associated cystocele, rectocele and enterocele  3. Stress urinary incontinence.  4. Rectal intussusception  5. Fecal incontinence    PROCEDURE:  Dr. Livingston:  1. Da Jonh laparoscopic sacral colpopexy  2. Da Jonh laparoscopic supracervical hysterectomy  3. Da Jonh laparoscopic bilateral salpingo-Oophorectomy  4. Da Jonh laparoscopic abdominal anterior, posterior and enterocele repairs  5. Retropubic midurethral sling  6. Cystourethroscopy    Dr. Atwood:  Da Jonh laparoscopic Rectopexy- see separate operative report    Both surgeons present and participating in the entire case.    ASSISTANT:  A skilled first assistant, CHUCHO Masters, was necessary for this procedure for assistance with patient positioning, prepping, draping, surgical visualization, performance of the repairs, wound closure and dressing application. The assistant was present for the entire procedure.    ANESTHESIA:  General endotracheal.    ESTIMATED BLOOD LOSS:  50 ml    IV FLUIDS:  1500 ml crystalloid    FINDINGS:  1. The bladder was found to be free of lesion. Ureters were in their normal anatomic positions, were noted to be patent via administration of dye.  2. The ovaries and tubes were normal appearing, removed per surgical plan.  3. Normal anorectal examination at the conclusion of the procedure.    DRAINS:  16-Andorran Thompson catheter to gravity drainage.    PACKING:  Saline-soaked vaginal packing placed.    COMPLICATIONS:  None.    INDICATIONS :  This patient was seen in consultation regarding uterovaginal prolapse and urinary  incontinence . Please refer to her clinic documentation for a complete description of her evaluation and treatment plan. She was desirous of a definitive surgical approach. Prior to the procedure the risks, benefits, indications, and alternatives were discussed. Written and verbal consent were obtained.    PROCEDURE IN DETAIL:  The patient was brought to the operating suite. She was administered prophylactic IV antibiotics, had sequential compression devices present and functioning on her lower extremities.    She was placed in a supine position, administered general endotracheal anesthesia without complication. She was now carefully positioned in the dorsal lithotomy position with her legs carefully stationed in Yellofin stirrups, with attention to avoiding pressure points. An exam under anesthesia was performed with noted relaxation, no adnexal or parametrial masses, normal anorectal exam. She was now sterilely prepped and draped both abdominally and vaginally, and an 16-Frisian Thompson catheter was placed to gravity drainage.    Laparoscopic entry:  At the base of the umbilicus, a skin incision was created. The incision was extended to 25- 30 mm and a dissection was performed down to the peritoneum and entry into the abdominal cavity was achieved. The gelpoint retractor/trocar mathis was inserted. Inspection of the intra-abdominal cavity showed there to be no lesions. She was placed in steep Trendelenburg position and 3 additional laparoscopic ports were placed, 8 mm far right quadrant, 8 mm mid left quadrant, and 8 mm far left quadrant. The da Jonh robotic arms were brought to the patient's bedside and operative control was assumed at the console.    Bilateral Salpingo-oophorectomy:  The right infundibulo-pelvic ligament was elevated. A peritoneal window was created below the ligament and above the level of the visualized ureter. The right IP ligament was then cauterized. The adnexa was grasped, the peritoneum was  cauterized mobilizing and the specimen. This was repeated on the left side in-a similar fashion. The specimens were left attached to the uterus.    Supracervical hysterectomy:  The left round ligament was grasped, cauterized, and incised. The anterior broad ligament was then scored opened. The utero-ovarian pedicle was now cauterized and incised. The broad ligament was further dissected using cautery. The uterine vessels were visualized and cauterized. Anteriorly, a peritoneal bladder flap had been developed transversely and dissected down past the external cervical os. This procedure was then repeated in a similar fashion on the patient's right side. At this point, the specimen was truncated from the residual cervix leaving 1-2 cm of residual cervical tissue. The specimen was tagged for later removal.    Sacral colpopexy:  A Lucite probe was placed within the vaginal canal. Anteriorly, the bladder was dissected down the anterior vaginal muscularis approximately 9 cm sagitally. Posteriorly, the peritoneum was dissected off the posterior rectovaginal septum and dissected down to the rectovaginal septum, approximately 11 cm sagitally, as close to the perineal body as possible.    Anterior and Posterior Repairs:  The posterior perirectal tissue and vaginal muscularis were cinched using several longitudinal placed 2-0 PDS sutures, performing an internal vaginal rectocele repair.  The anterior perivesical tissue and vaginal muscularis were cinched using several longitudinal placed sutures, performing an internal vaginal cystocele repair.    Sacral dissection:  At the sacral promontory the right ureter was noted to be lateral to the area of dissection. The peritoneum was elevated and incised. The peritoneal incision was taken down around the pelvic curvature to meet up with the posterior vaginal dissection. The peritoneal edges were carefully mobilized for future closure. At the sacral promontory the connective tissue was  dissected down to the anterior longitudinal ligament. The middle sacral vessel was cauterized.    Rectopexy dissection now performed by Dr. Atwood, dictated separately    Mesh Attachment.  A 5 cm x 15 cm piece of [employer] Polyform polypropylene mesh was attached to the anterior vaginal muscularis using approximately 9 interrupted sutures of 2-0 PDS. An identical sheet of mesh was attached to the posterior vaginal wall using approximately 9 interrupted sutures of 2-0 PDS. The long arms of the mesh were now brought to the sacral promontory and attached to the anterior longitudinal ligament using 3 interrupted sutures of 0 Mills-Lee. Appropriate tensioning was ascertained using visual and palpating clues. Redundant longitudinal mesh was trimmed and the resultant peritoneum was closed with monocryl suture, thus retroperitonealizing the mesh repair.    Rectopexy repair/attachment performed by Dr. Atwood, dictated separately.    Abdominal Enterocele Repair  Using a Halban technique, the deep pelvis was closed/obliterated using 2-0 PDS suture, imbricating the peritoneal tissue.    Cystourethroscopy was now performed, which noted patent ureters bilaterally and normal appearing bladder.    Specimen removal:  The uterine/adnexal specimen was now removed from the abdominal cavity through the umbilical incision using an endo-catch bag.    Laparoscopic closure:  The umbilical fascia was closed with 0-PDS suture. The CO2 gas was allowed to escape from the patient's abdomen, and the resultant 4 skin incisions were closed with a subcuticular suture of 4-0 vicryl and skin glue was applied.    Retropubic midurethral sling:  Two marks were created on the anterior abdominal wall 2.5 cm lateral to midline at the level of the pubic bone. Attention was turned to the vagina, where an Allis clamp was applied 1 cm distal from the meatus, an additional Allis clamp 2.5 cm from the meatus. Periurethral tissue was injected with a  solution of Marcaine with epinephrine. A 1.5 cm incision was created between the 2 Allis clamps beneath the mid urethra in the sagittal plane. Two periurethral tunnels were then created out laterally towards the pubic bone. Once an adequate dissection had been performed, the [employer] Advantage Fit device was selected and loaded. Trocar was brought through the patient's left periurethral tunnel back behind the pubic bone, through the space of Retzius, and out through the previously created tian on the anterior abdominal wall. The blue stay sheath was left in place.    This was repeated in a similar fashion on the patient's right side. The urethra was diverted in ipsilateral fashion during trocar placement. Cystourethroscopy was now performed with the above noted normal findings. The cystoscope was removed. The sling material was appropriately positioned beneath the mid urethra with no overt tension. The resultant incision was closed with a running locking suture of 2-0 Vicryl. Excess sling material on the anterior abdominal wall was trimmed. The resultant incisions were closed with Dermabond.    The vagina was packed with a saline-soaked vaginal packing. She had a 16-Kyrgyz Thompson catheter present to gravity drainage. Sponge, lap, and needle counts were found to be correct. There were no complications from surgery. Patient was awoken from anesthesia, and brought to the recovery room in excellent condition.    Lynn Livingston MD

## 2024-04-24 NOTE — PLAN OF CARE
Goal Outcome Evaluation:       Date & Time: 4/24/2024 0435-4917  Surgery/POD#: ROBOTIC SUTURE RECTOPEXY  ROBOTIC LAPAROSCOPIC SACRAL COLPOPEXY, SUPRACERVICAL HYSTERECTOMY WITH BILATERAL SALPINGECTOMY, ABDOMINAL ENTEROCELE REPAIR, CYSTOSCOPY POSSIBLE MIDURETHRAL SLING  BILATERAL OOPHORECTOMY  POSTERIOR REPAIR  Behavior & Aggression: calm and cooperative  Fall Risk: yes, pt is unsteady on her feet  Orientation:Pt is A/O x 4  ABNL VS/O2:VSS, Pt is on 2L of O2 to maintain O2 sats greater than 90% secondary to sedation  ABNL Labs: see computer for details  Pain Management:Pt c/o incisional abdominal pain, Toradol and  I.V. dilaudid administered per MD orders and relief noted.  Bowel/Bladder: Pt has a Thompson catheter in place draining clear orange urine  Drains: none  Wounds/incisions  Pt has 5 lap sites to her abdomen that are C/D/I with surgical skin glue.  Diet:Pt is tolerating a full liquid diet without nausea  Activity Level: Pt was an assist x 2 with the gait belt to the bathroom.  Pt thought she was going to have a BM, however she did not have one.  Tests/Procedures: none  Anticipated  DC Date: unknown  Significant Information: none

## 2024-04-25 VITALS
RESPIRATION RATE: 16 BRPM | TEMPERATURE: 98.4 F | WEIGHT: 151 LBS | DIASTOLIC BLOOD PRESSURE: 74 MMHG | HEART RATE: 76 BPM | SYSTOLIC BLOOD PRESSURE: 132 MMHG | HEIGHT: 60 IN | OXYGEN SATURATION: 92 % | BODY MASS INDEX: 29.64 KG/M2

## 2024-04-25 LAB
ANION GAP SERPL CALCULATED.3IONS-SCNC: 7 MMOL/L (ref 7–15)
BUN SERPL-MCNC: 23.5 MG/DL (ref 8–23)
CALCIUM SERPL-MCNC: 8.7 MG/DL (ref 8.8–10.2)
CHLORIDE SERPL-SCNC: 106 MMOL/L (ref 98–107)
CREAT SERPL-MCNC: 1.27 MG/DL (ref 0.51–0.95)
DEPRECATED HCO3 PLAS-SCNC: 27 MMOL/L (ref 22–29)
EGFRCR SERPLBLD CKD-EPI 2021: 47 ML/MIN/1.73M2
GLUCOSE BLDC GLUCOMTR-MCNC: 119 MG/DL (ref 70–99)
GLUCOSE SERPL-MCNC: 130 MG/DL (ref 70–99)
HGB BLD-MCNC: 12.2 G/DL (ref 11.7–15.7)
MAGNESIUM SERPL-MCNC: 1.8 MG/DL (ref 1.7–2.3)
PATH REPORT.COMMENTS IMP SPEC: NORMAL
PATH REPORT.COMMENTS IMP SPEC: NORMAL
PATH REPORT.FINAL DX SPEC: NORMAL
PATH REPORT.GROSS SPEC: NORMAL
PATH REPORT.MICROSCOPIC SPEC OTHER STN: NORMAL
PATH REPORT.RELEVANT HX SPEC: NORMAL
PHOSPHATE SERPL-MCNC: 2.6 MG/DL (ref 2.5–4.5)
PHOTO IMAGE: NORMAL
POTASSIUM SERPL-SCNC: 4.5 MMOL/L (ref 3.4–5.3)
SODIUM SERPL-SCNC: 140 MMOL/L (ref 135–145)

## 2024-04-25 PROCEDURE — 84100 ASSAY OF PHOSPHORUS: CPT | Performed by: COLON & RECTAL SURGERY

## 2024-04-25 PROCEDURE — 250N000013 HC RX MED GY IP 250 OP 250 PS 637: Performed by: COLON & RECTAL SURGERY

## 2024-04-25 PROCEDURE — 36415 COLL VENOUS BLD VENIPUNCTURE: CPT | Performed by: COLON & RECTAL SURGERY

## 2024-04-25 PROCEDURE — 85018 HEMOGLOBIN: CPT | Performed by: COLON & RECTAL SURGERY

## 2024-04-25 PROCEDURE — 80048 BASIC METABOLIC PNL TOTAL CA: CPT | Performed by: COLON & RECTAL SURGERY

## 2024-04-25 PROCEDURE — 83735 ASSAY OF MAGNESIUM: CPT | Performed by: COLON & RECTAL SURGERY

## 2024-04-25 PROCEDURE — 250N000011 HC RX IP 250 OP 636: Performed by: COLON & RECTAL SURGERY

## 2024-04-25 RX ORDER — OXYCODONE HYDROCHLORIDE 5 MG/1
5 TABLET ORAL EVERY 4 HOURS PRN
Qty: 8 TABLET | Refills: 0 | Status: SHIPPED | OUTPATIENT
Start: 2024-04-25

## 2024-04-25 RX ADMIN — ACETAMINOPHEN 975 MG: 325 TABLET, FILM COATED ORAL at 13:34

## 2024-04-25 RX ADMIN — ACETAMINOPHEN 975 MG: 325 TABLET, FILM COATED ORAL at 06:06

## 2024-04-25 RX ADMIN — OXYCODONE HYDROCHLORIDE 5 MG: 5 TABLET ORAL at 14:26

## 2024-04-25 RX ADMIN — OXYCODONE HYDROCHLORIDE 5 MG: 5 TABLET ORAL at 10:13

## 2024-04-25 RX ADMIN — OXYCODONE HYDROCHLORIDE 5 MG: 5 TABLET ORAL at 00:05

## 2024-04-25 RX ADMIN — KETOROLAC TROMETHAMINE 15 MG: 15 INJECTION, SOLUTION INTRAMUSCULAR; INTRAVENOUS at 15:43

## 2024-04-25 ASSESSMENT — ACTIVITIES OF DAILY LIVING (ADL)
ADLS_ACUITY_SCORE: 28
ADLS_ACUITY_SCORE: 24
ADLS_ACUITY_SCORE: 28
ADLS_ACUITY_SCORE: 24
ADLS_ACUITY_SCORE: 28
ADLS_ACUITY_SCORE: 28

## 2024-04-25 NOTE — PLAN OF CARE
Goal Outcome Evaluation:      Plan of Care Reviewed With: patient    Overall Patient Progress: improvingOverall Patient Progress: improving    Outcome Evaluation: Engaged in own POC, pain management going well.    Date & Time: 4/24/24, [id number]  Surgery/POD#: POD 1 from ROBOTIC SUTURE RECTOPEXY & ROBOTIC LAPAROSCOPIC SACRAL COLPOPEXY, SUPRACERVICAL HYSTERECTOMY WITH BILATERAL SALPINGECTOMY, ABDOMINAL ENTEROCELE REPAIR, CYSTOSCOPY POSSIBLE MIDURETHRAL SLING BILATERAL OOPHORECTOMY POSTERIOR REPAIR.  Behavior & Aggression: Green  Fall Risk: Yes  Orientation: A&Ox4  ABNL VS/O2: HTN, VSS RA   Pain Management: Scheduled Tylenol, oxy x1  Bowel/Bladder: Blair w/ orange-yellow OP, flatus-. Some leakage from the blair insertion site this shift  IV/Drains: PIV SL  Wounds/incisions/Skin: 6 abdominal lap sites CDI ex. scant clear drainage on umbilical site.  Diet: Tolerating full liquid  Activity Level: Ax1  Tests/Procedures: N/A  Anticipated DC Date: Today (4/25/24)

## 2024-04-25 NOTE — PROGRESS NOTES
Bridged for pt care [id number]    Pt AOx4, calm, cooperative. Reports pain 3/10, pain medication to be given for management. Plan to perform voiding trial, pain control, and bowel movement. Discharge home; reports son's girlfriend to pick her up/will be her .

## 2024-04-25 NOTE — PROGRESS NOTES
UROGYNECOLOGY    POST-OP DAY #1    S: Doing well   Ambulating: up last night  Diet: reg  Flatus: not yet  Pain control: good  Vaginal Pack: in   Thompson catheter: in, had some leak around the catheter and urethral irritation    O:  Vitals: BP (!) 149/74 (BP Location: Right arm, Patient Position: Sitting)   Pulse 88   Temp 98.1  F (36.7  C) (Oral)   Resp 16   Ht 1.524 m (5')   Wt 68.5 kg (151 lb)   SpO2 94%   BMI 29.49 kg/m    BMI= Body mass index is 29.49 kg/m .      Intake/Output Summary (Last 24 hours) at 4/25/2024 0635  Last data filed at 4/25/2024 0610  Gross per 24 hour   Intake 1896 ml   Output 2250 ml   Net -354 ml       Exam:  Appears healthy and well, A&O x3  Abdomen is soft, slight bloating, incisions C/D/I, good BS  Ext SCD, no edema  Thompson catheter in  Vaginal packing in  no perineal edema, incisions intact.    Labs:  HGB:  pre-op 13.9, creat 1.18   Post-op pending    Assessment and Plan:  POD# 1  A) Post-Operative Care:  ambulate   ADAT  continue with pain control strategies.  perform voiding trial when able to ambulate without assistance. ORDERED FOR THIS MORNING AT 0900  I reviewed post-operative instructions and precautions/ written information provided.  Discharge home anticipated LATER TODAY  Follow-up based on findings of voiding trial.    B) Medical:   RESTART HOME MEDS  DISCHARGE ORDER PER DR. COVARRUBIAS'S TEAM    Lynn Livingston MD

## 2024-04-25 NOTE — PROGRESS NOTES
Colon and Rectal Surgery  Daily Progress Note    Subjective  Patient reports doing well. Pain well controlled. Tolerating fulls. Ambulating in room. No bowel function yet.     Objective  Intake/Output last 24 hrs:  Temp:  [97.6  F (36.4  C)-98.8  F (37.1  C)] 98.1  F (36.7  C)  Pulse:  [73-96] 88  Resp:  [11-36] 16  BP: (117-155)/(66-95) 149/74  SpO2:  [94 %-100 %] 94 %         Physical Exam:  General: awake, alert, in no acute distress  Respiratory: non-labored breathing  Abdomen: soft, appropriately tender, non-distended              Incisions: clean, dry and intact    Pertinent Labs  Lab Results: personally reviewed.  Lab Results   Component Value Date     04/25/2024     04/24/2024     05/15/2007     05/23/2006     12/20/2005    CO2 27 04/25/2024    CO2 22 04/24/2024    CO2 22 05/15/2007    CO2 26 05/23/2006    CO2 28 12/20/2005    BUN 23.5 04/25/2024    BUN 22.6 04/24/2024    BUN 46 05/15/2007    BUN 25 05/23/2006    BUN 23 12/20/2005     Lab Results   Component Value Date    WBC 8.2 04/24/2024    WBC 7.2 05/15/2007    WBC 6.6 05/23/2006    WBC 4.2 12/20/2005    HGB 12.2 04/25/2024    HGB 13.9 04/24/2024    HGB 10.6 05/15/2007    HGB 12.7 05/23/2006    HGB 13.3 12/20/2005    HCT 41.8 04/24/2024    HCT 30.8 05/15/2007    HCT 36.6 05/23/2006    HCT 38.4 12/20/2005    MCV 92 04/24/2024    MCV 92 05/15/2007    MCV 91 05/23/2006    MCV 96 12/20/2005     04/24/2024     05/15/2007     05/23/2006     12/20/2005       Assessment/Plan: This is a 63 year old female POD #1 s/p sacral colpopexy, supracervical hysterectomy , BSO, Retropubic midurethral sling and ventral mesh rectopexy who is doing as expected post op.    - Reg diet this AM   - discharge Thompson   - Ambulate   - IS/DB  - Multimodal jono control   - Home later this AM    Discussed with Dr. Angelic Lozoya MD on 4/25/2024 at 7:51 AM   Colon and rectal surgery fellow

## 2024-04-25 NOTE — DISCHARGE SUMMARY
[facility] Discharge Summary      Liliam Mello MRN# [id number]   Age: 63 year old YOB: 1960     Date of Admission:  4/24/2024  Date of Discharge::  4/25/2024  Admitting Physician:  Judith Atwood MD  Discharge Physician:  Judith Atwood MD     PCP:  Jennifer Coley    Disposition: Patient discharged from [facility] to home in improving condition.        Primary Diagnosis:   Pelvic organ prolapse             Discharge Medications:     Current Discharge Medication List        START taking these medications    Details   oxyCODONE (ROXICODONE) 5 MG tablet Take 1 tablet (5 mg) by mouth every 4 hours as needed for moderate pain  Qty: 8 tablet, Refills: 0    Associated Diagnoses: Incomplete uterovaginal prolapse           CONTINUE these medications which have NOT CHANGED    Details   amLODIPine (NORVASC) 5 MG tablet Take 5 mg by mouth daily      ascorbic acid 1000 MG TABS tablet Take 1,000 mg by mouth daily      cinnamon 500 MG TABS Take 2,000 mg by mouth daily      COLLAGEN PO Take 4,000 mg by mouth daily      ECHINACEA PO Take 1 capsule by mouth daily      gemfibrozil (LOPID) 600 MG tablet Take 300 mg by mouth once      Ginseng 100 MG CAPS Take 1 capsule by mouth daily      l-lysine HCl 600 MG TABS Take 600 mg by mouth daily      levothyroxine (SYNTHROID/LEVOTHROID) 75 MCG tablet Take 75 mcg by mouth daily      magnesium 250 MG tablet Take 500 mg by mouth daily      Milk Thistle 1000 MG CAPS Take 1 capsule by mouth daily      Misc Natural Products (GLUCOSAMINE CHOND MSM FORMULA PO) Take 1,500 mg by mouth daily      Multiple Vitamins-Minerals (WOMENS 50+ MULTI VITAMIN PO) Take 1 tablet by mouth daily      Omega 3-6-9 Fatty Acids (OMEGA 3-6-9 COMPLEX PO) Take 3 capsules by mouth daily      !! OVER-THE-COUNTER Take 1,000 mg by mouth daily   NAME:FAVIO OMEGAS      !! OVER-THE-COUNTER Take 480 mg by mouth 3 times daily 1 TO 3 TIMES DAILY  NAME: REXALL ACV 480MG       !! OVER-THE-COUNTER Take 1.5 tablets by mouth daily   CALCIUM CITRATE 630MG W/ VITAMIN D3 500UI      !! OVER-THE-COUNTER Take 1 capsule by mouth daily   JARSimplyCast EAR HEALTH      Probiotic Product (ACIDOPHILUS, PROBIOTIC, PO) Take 1 capsule by mouth daily      saw palmetto (SERENOA REPENS) 160 MG capsule Take 320 mg by mouth daily      Specialty Vitamins Products (CARDIOTEK PO) Take 2 capsules by mouth daily   L-CITRULLINE/L-ARGININE/ HORSE CHESTNUT      Turmeric 500 MG CAPS Take 500 mg by mouth daily      vitamin B-Complex Take 1 tablet by mouth daily      vitamin D3 (CHOLECALCIFEROL) 250 mcg (53935 units) capsule Take 1 capsule by mouth daily      zinc gluconate 50 MG tablet Take 50 mg by mouth daily       !! - Potential duplicate medications found. Please discuss with provider.                 Follow Up, Special Instructions:     Discharge diet: Regular   Discharge activity: No lifting, driving, or strenuous exercise for 8 week(s)   Discharge follow-up: Follow up with Dr. Livingston and Angelic in 2-4 weeks   Wound care: Keep wound clean and dry              Procedures:     Procedure(s): Robotic assisted VINCENT BSO with multicompartment pelvic organ prolapse repair       -            Consultations:   None          Brief Hospital Summary:     Liliam Mello is a 63 year old woman who underwent the above procedure on 4/25.    There were no immediate complications during this procedure.    Please refer to the full operative summary for details.  The patient's hospital course was unremarkable.  Pain was controlled on oral pain regimen.  She was tolerating a low fiber diet.  Vaginal packing and Thompson removed per the urogynecology team. Bowel function had returned prior to discharge.  She recovered as anticipated and experienced no post-operative complications.    Physical Exam:    Temp: 98.4  F (36.9  C) Temp src: Oral BP: 132/74 Pulse: 76   Resp: 16 SpO2: 92 % O2 Device: None (Room air) Oxygen Delivery: 2 LPM      General - Awake alert and oriented, appears stated age  Pulm - Non-labored breathing with normal respiratory effort  CVS - reg rate and rhythm, no peripheral edema  Abd - soft, appropriately tender and nondistended  Neuro - CN II-XII grossly intact  Musculoskeletal - extremities with no clubbing, cyanosis or edema; able to ambulate  Psych - responsive, alert, cooperative; oriented x3; appropriate mood and affect  External/skin - inspection reveals no rashes, lesions or ulcers, normal coloring        Attestation:  I have reviewed today's vital signs, notes, medications, labs and imaging.    Judith Atwood MD  [facility]          ADDENDUM:  Length of stay: 1 days  Indicate Y or N for the following:  UTI  No  C diff  No  PNA  No  SSI No  DVT No  PE  No  CVA No  MI No  Enterocutaneous fistula  No  Peripheral nerve injury  No  Abscess (not adjacent to anastomosis)  No  Leak No    Treated with:   Antibiotics N/A   Drain  N/A   Reoperation  N/A  Death within 30 days No  Reintubation  No  Reoperation  No   Procedure

## 2024-04-25 NOTE — PLAN OF CARE
Date & Time: 1900-2330.  Surgery/POD#: POD 0 from ROBOTIC SUTURE RECTOPEXY &  ROBOTIC LAPAROSCOPIC SACRAL COLPOPEXY, SUPRACERVICAL HYSTERECTOMY WITH BILATERAL SALPINGECTOMY, ABDOMINAL ENTEROCELE REPAIR, CYSTOSCOPY POSSIBLE MIDURETHRAL SLING  BILATERAL OOPHORECTOMY  POSTERIOR REPAIR.  Behavior & Aggression: Green- no concerns.  Fall Risk: Yes.  Orientation:A&Ox4.  ABNL VS/O2:VSS on RA.  ABNL Labs: see chart.  Pain Management: Scheduled Tylenol.  Bowel/Bladder: Thompson w/ orangish yellow UOP, hypoactive bowel sounds, no BM, passing little to no gas per pt.  IV/Drains: PIV SL.  Wounds/incisions/Skin: Abdominal incisions are C/D/I.  Diet: Tolerated full liquids.  Activity Level: Ax1.  Tests/Procedures: N/A.  Anticipated  DC Date: Possibly tomorrow?

## 2024-04-26 NOTE — PLAN OF CARE
Date & Time: 4/25 0900-discharge  Surgery/POD#: 1 hysterectomy sling & rectopexy  Behavior & Aggression: Green  Fall Risk: No  Orientation:A&Ox4  ABNL VS/O2:VSS  Pain Management:oxycodone, tylenol, Toradol  Bowel/Bladder: voiding adequately, PVR 5 & 10. Passed fill and pull void trial, only tolerated 200 mL fill  Drains: PIV x2 discontinued. Thompson removed.  Wounds/incisions: lap sites WDL  Diet:Regular, tolerating  Activity Level: SBA/Ind  Tests/Procedures: NA  Anticipated  DC Date: home today with family assist. AVS given & reviewed, all questions answered  Significant Information: Vaginal packing removed.

## (undated) DEVICE — DAVINCI CONMED AIRSEAL CAP & OBTURATOR BLADELESS 8MM IAS8-DV

## (undated) DEVICE — SU PROLENE 2-0 SHDA 48" 8533H

## (undated) DEVICE — SPONGE PACK VAGINAL 2"X9

## (undated) DEVICE — DAVINCI HOT SHEARS TIP COVER  400180

## (undated) DEVICE — SU VICRYL 2-0 CT-2 27" J333H

## (undated) DEVICE — KIT PATIENT POSITIONING PIGAZZI LATEX FREE 40580

## (undated) DEVICE — SOL WATER IRRIG 3000ML BAG 2B7117

## (undated) DEVICE — SPONGE RAY-TEC 3X3" 30-094

## (undated) DEVICE — SPONGE RAY-TEC 4X8" 7318

## (undated) DEVICE — DECANTER BAG 2002S

## (undated) DEVICE — SU PDS II 2-0 CT-2 27"  Z333H

## (undated) DEVICE — BLADE CLIPPER 4406

## (undated) DEVICE — SU VICRYL 2-0 SH 27" J317H

## (undated) DEVICE — DRAPE LEGGINGS 8421

## (undated) DEVICE — DAVINCI XI DRAPE COLUMN 470341

## (undated) DEVICE — DAVINCI XI OBTURATOR BLADELESS 8MM 470359

## (undated) DEVICE — DRAPE MAYO STAND 23X54 8337

## (undated) DEVICE — TUBING SUCTION 12"X1/4" N612

## (undated) DEVICE — DRAPE VAGI BAG 18X9" 1072

## (undated) DEVICE — ADAPTER DRAPE ALLY AU-AD

## (undated) DEVICE — SOL NACL 0.9% IRRIG 1000ML BOTTLE 2F7124

## (undated) DEVICE — DAVINCI XI SEAL UNIVERSAL 5-8MM 470361

## (undated) DEVICE — SU MONOCRYL 0 CT-2 27" Y334H

## (undated) DEVICE — BLADE KNIFE SURG 15 371115

## (undated) DEVICE — CATH TRAY FOLEY SURESTEP 16FR WDRAIN BAG STLK LATEX A300316A

## (undated) DEVICE — GLOVE PROTEXIS BLUE W/NEU-THERA 6.5  2D73EB65

## (undated) DEVICE — SOL NACL 0.9% INJ 1000ML BAG 2B1324X

## (undated) DEVICE — PACK DAVINCI UROLOGY SBA15UDFSG

## (undated) DEVICE — ENDO ACCESS PLATFORM GELPOINT SGL INCISION CNGL2

## (undated) DEVICE — RULER SURGICAL PLASTIC STRL LF CS628

## (undated) DEVICE — KIT SIGMOIDOSCOPE ENDOSCOPIC LIGHTED 18FR KI613/10

## (undated) DEVICE — TUBING CYSTO/BLADDER IRRIG SET 80" 06544-01

## (undated) DEVICE — PROTECTOR ARM ONE-STEP TRENDELENBURG 40418

## (undated) DEVICE — ANTIFOG SOLUTION SEE SHARP 150M TROCAR SWABS 30978

## (undated) DEVICE — ESU PENCIL W/HOLSTER E2350H

## (undated) DEVICE — GOWN IMPERVIOUS ZONED LG

## (undated) DEVICE — ESU ELEC BLADE NN-STCK PLUMEPEN PRO 360D 10FT PLPRO4020

## (undated) DEVICE — TUBING CONMED AIRSEAL SMOKE EVAC INSUFFLATION ASM-EVAC

## (undated) DEVICE — SUCTION TIP YANKAUER MEDI-VAC K82

## (undated) DEVICE — NDL INSUFFLATION 13GA 120MM C2201

## (undated) DEVICE — SU PDS II 3-0 SH 27" Z316H

## (undated) DEVICE — SU VICRYL 3-0 SH 27" UND J416H

## (undated) DEVICE — NDL COUNTER 10CT

## (undated) DEVICE — SU ETHIBOND 2-0 SHDA 30" X563H

## (undated) DEVICE — SUCTION IRR STRYKERFLOW II W/TIP 250-070-520

## (undated) DEVICE — SOL WATER IRRIG 1000ML BOTTLE 2F7114

## (undated) DEVICE — SU MONOCRYL 4-0 PS-2 18" UND Y496G

## (undated) DEVICE — LINEN TOWEL PACK X5 5464

## (undated) DEVICE — ESU GROUND PAD UNIVERSAL W/O CORD

## (undated) DEVICE — GLOVE BIOGEL PI MICRO INDICATOR UNDERGLOVE SZ 6.5 48965

## (undated) DEVICE — GLOVE BIOGEL PI MICRO INDICATOR UNDERGLOVE SZ 6.0 48960

## (undated) DEVICE — NDL 25GA 1.5" 305127

## (undated) DEVICE — DRAPE POUCH IRR 1016

## (undated) DEVICE — SYR 10ML FINGER CONTROL W/O NDL 309695

## (undated) DEVICE — DAVINCI XI DRAPE ARM 470015

## (undated) DEVICE — DRAPE IOBAN INCISE 23X17" 6650EZ

## (undated) DEVICE — SU PDS II 0 CT 36" Z358T

## (undated) RX ORDER — FENTANYL CITRATE 0.05 MG/ML
INJECTION, SOLUTION INTRAMUSCULAR; INTRAVENOUS
Status: DISPENSED
Start: 2024-04-24

## (undated) RX ORDER — ONDANSETRON 2 MG/ML
INJECTION INTRAMUSCULAR; INTRAVENOUS
Status: DISPENSED
Start: 2024-04-24

## (undated) RX ORDER — ACETAMINOPHEN 325 MG/1
TABLET ORAL
Status: DISPENSED
Start: 2024-04-24

## (undated) RX ORDER — METRONIDAZOLE 500 MG/100ML
INJECTION, SOLUTION INTRAVENOUS
Status: DISPENSED
Start: 2024-04-24

## (undated) RX ORDER — DEXAMETHASONE SODIUM PHOSPHATE 4 MG/ML
INJECTION, SOLUTION INTRA-ARTICULAR; INTRALESIONAL; INTRAMUSCULAR; INTRAVENOUS; SOFT TISSUE
Status: DISPENSED
Start: 2024-04-24

## (undated) RX ORDER — BUPIVACAINE HYDROCHLORIDE 2.5 MG/ML
INJECTION, SOLUTION EPIDURAL; INFILTRATION; INTRACAUDAL
Status: DISPENSED
Start: 2024-04-24

## (undated) RX ORDER — PHENAZOPYRIDINE HYDROCHLORIDE 200 MG/1
TABLET, FILM COATED ORAL
Status: DISPENSED
Start: 2024-04-24

## (undated) RX ORDER — PROPOFOL 10 MG/ML
INJECTION, EMULSION INTRAVENOUS
Status: DISPENSED
Start: 2024-04-24

## (undated) RX ORDER — HYDROMORPHONE HCL IN WATER/PF 6 MG/30 ML
PATIENT CONTROLLED ANALGESIA SYRINGE INTRAVENOUS
Status: DISPENSED
Start: 2024-04-24

## (undated) RX ORDER — FENTANYL CITRATE 50 UG/ML
INJECTION, SOLUTION INTRAMUSCULAR; INTRAVENOUS
Status: DISPENSED
Start: 2024-04-24

## (undated) RX ORDER — HYDROMORPHONE HYDROCHLORIDE 1 MG/ML
INJECTION, SOLUTION INTRAMUSCULAR; INTRAVENOUS; SUBCUTANEOUS
Status: DISPENSED
Start: 2024-04-24

## (undated) RX ORDER — CEFAZOLIN SODIUM/WATER 2 G/20 ML
SYRINGE (ML) INTRAVENOUS
Status: DISPENSED
Start: 2024-04-24